# Patient Record
Sex: FEMALE | Race: BLACK OR AFRICAN AMERICAN
[De-identification: names, ages, dates, MRNs, and addresses within clinical notes are randomized per-mention and may not be internally consistent; named-entity substitution may affect disease eponyms.]

---

## 2017-02-21 ENCOUNTER — HOSPITAL ENCOUNTER (EMERGENCY)
Dept: HOSPITAL 17 - PHEFT | Age: 37
Discharge: HOME | End: 2017-02-21
Payer: MEDICARE

## 2017-02-21 VITALS
HEART RATE: 82 BPM | SYSTOLIC BLOOD PRESSURE: 135 MMHG | OXYGEN SATURATION: 96 % | RESPIRATION RATE: 18 BRPM | DIASTOLIC BLOOD PRESSURE: 93 MMHG | TEMPERATURE: 98.3 F

## 2017-02-21 VITALS — BODY MASS INDEX: 42.63 KG/M2 | HEIGHT: 68 IN | WEIGHT: 281.31 LBS

## 2017-02-21 DIAGNOSIS — Z87.09: ICD-10-CM

## 2017-02-21 DIAGNOSIS — Z86.2: ICD-10-CM

## 2017-02-21 DIAGNOSIS — Z87.39: ICD-10-CM

## 2017-02-21 DIAGNOSIS — J01.01: Primary | ICD-10-CM

## 2017-02-21 DIAGNOSIS — Z86.79: ICD-10-CM

## 2017-02-21 DIAGNOSIS — Z21: ICD-10-CM

## 2017-02-21 DIAGNOSIS — Z86.69: ICD-10-CM

## 2017-02-21 DIAGNOSIS — Z86.59: ICD-10-CM

## 2017-02-21 DIAGNOSIS — Z87.19: ICD-10-CM

## 2017-02-21 PROCEDURE — 99283 EMERGENCY DEPT VISIT LOW MDM: CPT

## 2017-02-21 NOTE — PD
HPI


.


Sinus congestion


Chief Complaint:  Cold / Flu Symptoms


Time Seen by Provider:  08:13


Travel History


International Travel<30 days:  No


Contact w/Intl Traveler<30days:  No





History of Present Illness


HPI


Patient presents with about a 3 day history of sinus congestion.  She has been 

treating it at home with Zyrtec-D and Robitussin.  The complicating factor with 

this patient is that she is HIV positive.  She denies any known fever.  Her 

sinus drainage has been purulent.





PFSH


Past Medical History


Anemia:  Yes


Asthma:  No


Autoimmune Disease:  Yes (HIV + )


Anxiety:  Yes


Depression:  Yes


Heart Rhythm Problems:  Yes (SINUS TACH )


Cancer:  No


Cardiovascular Problems:  Yes


Diabetes:  No


Diminished Hearing:  No


Endocrine:  No


Gastrointestinal Disorders:  Yes


Hypertension:  No


Immune Disorder:  Yes (HIV/AIDS/MAC)


Implanted Vascular Access Dvce:  No


Musculoskeletal:  Yes


Neurologic:  Yes


Psychiatric:  Yes


Respiratory:  Yes (occ bronchitis)


Immunizations Current:  Yes


PNEUMOCCOCAL Vaccine (Year):  


Menopausal:  No


:  2


Para:  2





Past Surgical History


Abdominal Surgery:  Yes (BIOPSY OF LYMPH NODES- PEG TUBE PLACEMENT)


 Section:  Yes (X 2)


Cholecystectomy:  No


Gynecologic Surgery:  Yes ((2) C-Sections)


Other Surgery:  Yes (lymph nodes biopsy in abd)





Social History


Alcohol Use:  No


Tobacco Use:  No


Substance Use:  No





Allergies-Medications


(Allergen,Severity, Reaction):  


Coded Allergies:  


     Albuterol (Verified  Allergy, Severe, 9/8/15)


     Bactrim (Verified  Allergy, Severe, Shortness of Breath, 9/8/15)


Reported Meds & Prescriptions





Reported Meds & Active Scripts


Active


Medrol Dosepak (Methylprednisolone) 4 Mg Bharathi 4 Mg PO AS DIRECTED 


     TAKE AS DIRECTED


Tylenol #3 (Acetaminophen/Codeine Phosphate) Acetaminophen 300/30 Codeine Tab 1 

Tab PO Q6H PRN


     FOR PAIN


Zithromax Z-Bharathi (Azithromycin) 250 Mg Tab 250 Mg PO AS DIRECTED 


     500 MG (2 TABLETS) PO ON DAY 1, THEN 250 MG (1 TABLET) PO ON DAYS 2


     TO 5.


Reported


Triumeq 600- mg (Abacavir-Dolutegravir-Lamivudi) 1 Tab Tab 1 Tab PO DAILY 








Review of Systems


Except as stated in HPI:  all other systems reviewed are Neg


General / Constitutional:  No: Fever, Chills


HENT:  Positive: Rhinitis, Rhinorrhea, Congestion, Other (purulent sinus 

drainage)





Physical Exam


Narrative


GENERAL: Healthy-appearing woman in no acute distress.


SKIN: Warm and dry.


HEAD: Atraumatic. Normocephalic.  Tenderness to percussion over the maxillary 

sinuses.


EYES: Pupils equal and round. 


ENT: No nasal bleeding or discharge.  Mucous membranes pink and moist.  No 

edema or erythema of the nasal mucosa.  Oropharynx is clear.


NECK: Trachea midline.  Neck is supple without cervical lymphadenopathy.


CARDIOVASCULAR: Regular rate and rhythm.  Heart sounds are normal.


RESPIRATORY: No accessory muscle use.  Lungs are clear with full air movement 

throughout.


GASTROINTESTINAL: Abdomen soft, non-tender, nondistended. 


MUSCULOSKELETAL: No obvious deformities.   No edema. 


NEUROLOGICAL: Awake and alert. No obvious cranial nerve deficits.  Motor 

grossly within normal limits. Normal speech.


PSYCHIATRIC: Appropriate mood and affect; insight and judgment normal.





Data


Data


Last Documented VS





Vital Signs








  Date Time  Temp Pulse Resp B/P Pulse Ox O2 Delivery O2 Flow Rate FiO2


 


17 07:56 98.3 82 18 135/93 96 Room Air  











MDM


Medical Decision Making


Medical Screen Exam Complete:  Yes


Emergency Medical Condition:  Yes


Differential Diagnosis


Differential diagnosis includes but is not limited to influenza, upper 

respiratory infection, bronchitis, pneumonia


Narrative Course


Patient presents with symptoms compatible with sinusitis.  Because of her 

history of HIV/AIDS, I queried UpToDate which recommends treatment with an 

antibiotic to cover pseudomonas.





Diagnosis





 Primary Impression:  


 Sinusitis


 Qualified Code:  J01.01 - Acute recurrent maxillary sinusitis


 Additional Impression:  


 HIV (human immunodeficiency virus infection)


Patient Instructions:  General Instructions, Sinusitis (ED)





***Additional Instructions:


I recommend the use of a Neti Pot.


You may use a nasal spray such as Afrin for up to 3 days as needed for nasal 

congestion.


You may take an over-the-counter antihistamine such as Zyrtec, Allegra or 

Claritin as needed for runny secretions.


You may take pseudoephedrine as needed for congestion.  You will need to sign 

for this at the pharmacy.


You may take plain Mucinex, 1200 mg twice a day as needed for thick secretions.


You may take a cough syrup such as Delsym as needed for cough.


***Med/Other Pt SpecificInfo:  Prescription(s) given


Scripts


Levofloxacin (Levaquin)500 Mg Nnu482 Mg PO DAILY  #10 TAB  Ref 0


   Prov:Leigh Almodovar MD         17


Disposition:  01 DISCHARGE HOME


Condition:  Stable








Leigh Almodovar MD 2017 08:27

## 2017-03-20 ENCOUNTER — HOSPITAL ENCOUNTER (OUTPATIENT)
Dept: HOSPITAL 17 - CLAB | Age: 37
End: 2017-03-20
Attending: SPECIALIST
Payer: MEDICARE

## 2017-03-20 DIAGNOSIS — B20: Primary | ICD-10-CM

## 2017-03-20 LAB
ERYTHROCYTE [DISTWIDTH] IN BLOOD BY AUTOMATED COUNT: 13.3 % (ref 11.6–17.2)
HCT VFR BLD CALC: 33.6 % (ref 35–46)
MCH RBC QN AUTO: 29 PG (ref 27–34)
MCHC RBC AUTO-ENTMCNC: 34.7 % (ref 32–36)
MCV RBC AUTO: 83.4 FL (ref 80–100)
PLATELET # BLD: 177 TH/MM3 (ref 150–450)
RBC # BLD AUTO: 4.03 MIL/MM3 (ref 4–5.3)
REVIEW FLAG: (no result)
WBC # BLD AUTO: 4 TH/MM3 (ref 4–11)

## 2017-03-20 PROCEDURE — 86359 T CELLS TOTAL COUNT: CPT

## 2017-03-20 PROCEDURE — 86357 NK CELLS TOTAL COUNT: CPT

## 2017-03-20 PROCEDURE — 86355 B CELLS TOTAL COUNT: CPT

## 2017-03-20 PROCEDURE — 85027 COMPLETE CBC AUTOMATED: CPT

## 2017-03-20 PROCEDURE — 36415 COLL VENOUS BLD VENIPUNCTURE: CPT

## 2017-03-20 PROCEDURE — 87536 HIV-1 QUANT&REVRSE TRNSCRPJ: CPT

## 2017-03-20 PROCEDURE — 86360 T CELL ABSOLUTE COUNT/RATIO: CPT

## 2017-03-22 LAB
CD 19 PERCENT: 19 % (ref 6–29)
CD3 CELLS # BLD: 1006 /UL (ref 840–3060)
CD3 CELLS NFR BLD: 74 % (ref 57–85)
CD3+CD4+ CELLS NFR BLD: 13 % (ref 30–61)
CD3+CD4+ CELLS/CD3+CD8+ CLL BLD: 0.2 % (ref 0.86–5)
CD3+CD8+ CELLS # BLD: 786 /UL (ref 180–1170)
CD3+CD8+ CELLS NFR BLD: 56 % (ref 12–42)
LYMPHOCYTES # BLD AUTO: 1363 10*3/UL (ref 850–3900)

## 2017-03-23 LAB — HIV RNA LOG COPIES: 4.72

## 2017-05-31 ENCOUNTER — HOSPITAL ENCOUNTER (OUTPATIENT)
Dept: HOSPITAL 17 - CLAB | Age: 37
End: 2017-05-31
Attending: SPECIALIST
Payer: MEDICARE

## 2017-05-31 DIAGNOSIS — B20: Primary | ICD-10-CM

## 2017-05-31 LAB
ALP SERPL-CCNC: 67 U/L (ref 45–117)
ALT SERPL-CCNC: 19 U/L (ref 10–53)
ANION GAP SERPL CALC-SCNC: 5 MEQ/L (ref 5–15)
AST SERPL-CCNC: 19 U/L (ref 15–37)
BILIRUB SERPL-MCNC: 0.5 MG/DL (ref 0.2–1)
BUN SERPL-MCNC: 10 MG/DL (ref 7–18)
CHLORIDE SERPL-SCNC: 105 MEQ/L (ref 98–107)
ERYTHROCYTE [DISTWIDTH] IN BLOOD BY AUTOMATED COUNT: 14.6 % (ref 11.6–17.2)
GFR SERPLBLD BASED ON 1.73 SQ M-ARVRAT: 60 ML/MIN (ref 89–?)
HCO3 BLD-SCNC: 27.8 MEQ/L (ref 21–32)
HCT VFR BLD CALC: 36.3 % (ref 35–46)
MCH RBC QN AUTO: 29.8 PG (ref 27–34)
MCHC RBC AUTO-ENTMCNC: 34.7 % (ref 32–36)
MCV RBC AUTO: 85.9 FL (ref 80–100)
PLATELET # BLD: 219 TH/MM3 (ref 150–450)
POTASSIUM SERPL-SCNC: 3.7 MEQ/L (ref 3.5–5.1)
RBC # BLD AUTO: 4.23 MIL/MM3 (ref 4–5.3)
REVIEW FLAG: (no result)
SODIUM SERPL-SCNC: 138 MEQ/L (ref 136–145)
WBC # BLD AUTO: 5.2 TH/MM3 (ref 4–11)

## 2017-05-31 PROCEDURE — 86359 T CELLS TOTAL COUNT: CPT

## 2017-05-31 PROCEDURE — 36415 COLL VENOUS BLD VENIPUNCTURE: CPT

## 2017-05-31 PROCEDURE — 80053 COMPREHEN METABOLIC PANEL: CPT

## 2017-05-31 PROCEDURE — 86357 NK CELLS TOTAL COUNT: CPT

## 2017-05-31 PROCEDURE — 85027 COMPLETE CBC AUTOMATED: CPT

## 2017-05-31 PROCEDURE — 86360 T CELL ABSOLUTE COUNT/RATIO: CPT

## 2017-05-31 PROCEDURE — 86355 B CELLS TOTAL COUNT: CPT

## 2017-05-31 PROCEDURE — 87536 HIV-1 QUANT&REVRSE TRNSCRPJ: CPT

## 2017-06-02 LAB
CD 19 PERCENT: 25 % (ref 6–29)
CD3 CELLS # BLD: 1167 /UL (ref 840–3060)
CD3 CELLS NFR BLD: 68 % (ref 57–85)
CD3+CD4+ CELLS NFR BLD: 15 % (ref 30–61)
CD3+CD4+ CELLS/CD3+CD8+ CLL BLD: 0.3 % (ref 0.86–5)
CD3+CD8+ CELLS # BLD: 813 /UL (ref 180–1170)
CD3+CD8+ CELLS NFR BLD: 49 % (ref 12–42)

## 2017-06-03 LAB
HIV RNA COPIES: (no result)
HIV RNA LOG COPIES: (no result)

## 2017-09-02 ENCOUNTER — HOSPITAL ENCOUNTER (EMERGENCY)
Dept: HOSPITAL 17 - PHED | Age: 37
Discharge: HOME | End: 2017-09-02
Payer: MEDICARE

## 2017-09-02 VITALS — DIASTOLIC BLOOD PRESSURE: 72 MMHG | SYSTOLIC BLOOD PRESSURE: 128 MMHG

## 2017-09-02 VITALS
RESPIRATION RATE: 12 BRPM | TEMPERATURE: 98.3 F | HEART RATE: 91 BPM | DIASTOLIC BLOOD PRESSURE: 73 MMHG | SYSTOLIC BLOOD PRESSURE: 135 MMHG | OXYGEN SATURATION: 99 %

## 2017-09-02 VITALS — HEIGHT: 67 IN | WEIGHT: 293 LBS | BODY MASS INDEX: 45.99 KG/M2

## 2017-09-02 DIAGNOSIS — Z86.59: ICD-10-CM

## 2017-09-02 DIAGNOSIS — Z86.79: ICD-10-CM

## 2017-09-02 DIAGNOSIS — Z86.2: ICD-10-CM

## 2017-09-02 DIAGNOSIS — Z87.39: ICD-10-CM

## 2017-09-02 DIAGNOSIS — Z72.0: ICD-10-CM

## 2017-09-02 DIAGNOSIS — Z86.69: ICD-10-CM

## 2017-09-02 DIAGNOSIS — Z21: ICD-10-CM

## 2017-09-02 DIAGNOSIS — K05.10: Primary | ICD-10-CM

## 2017-09-02 DIAGNOSIS — Z87.19: ICD-10-CM

## 2017-09-02 DIAGNOSIS — K04.7: ICD-10-CM

## 2017-09-02 PROCEDURE — 99284 EMERGENCY DEPT VISIT MOD MDM: CPT

## 2017-09-02 NOTE — PD
HPI


Chief Complaint:  Oral / Dental Pain or Problem


Time Seen by Provider:  01:57


Travel History


International Travel<30 days:  No


Contact w/Intl Traveler<30days:  No


Traveled to known affect area:  No





History of Present Illness


HPI


The patient is a 37-year-old female that has dental pain in teeth #29, 30, 31 

and 32.  She has no appointment with a dentist yet but states she will get one.

  She is noted this pain getting worse in the last 24 hours.  She knows she has 

dental problems  chronically.  She has never seen a dentist about this.  She 

denies any fever.  She is HIV positive but her T4 count is excellent.





PFSH


Past Medical History


Anemia:  Yes


Asthma:  No


Autoimmune Disease:  Yes (HIV + )


Anxiety:  Yes


Depression:  Yes


Heart Rhythm Problems:  Yes (SINUS TACH )


Cancer:  No


Cardiovascular Problems:  Yes


Diabetes:  No


Diminished Hearing:  No


Endocrine:  No


Gastrointestinal Disorders:  Yes


Hypertension:  No


Immune Disorder:  Yes (HIV/AIDS/MAC)


Implanted Vascular Access Dvce:  No


Musculoskeletal:  Yes


Neurologic:  Yes


Psychiatric:  Yes


Respiratory:  Yes (occ bronchitis)


Immunizations Current:  Yes


PNEUMOCCOCAL Vaccine (Year):  


Pregnant?:  Not Pregnant


LMP:  17


Menopausal:  No


:  2


Para:  2





Past Surgical History


Abdominal Surgery:  Yes (BIOPSY OF LYMPH NODES- PEG TUBE PLACEMENT)


 Section:  Yes (X 2)


Cholecystectomy:  No


Gynecologic Surgery:  Yes ((2) C-Sections)


Other Surgery:  Yes (lymph nodes biopsy in abd)





Social History


Alcohol Use:  No


Tobacco Use:  Yes


Substance Use:  No





Allergies-Medications


(Allergen,Severity, Reaction):  


Coded Allergies:  


     albuterol (Unverified  Allergy, Severe, 17)


     sulfamethoxazole (Unverified  Allergy, Severe, Shortness of Breath, 17)


     trimethoprim (Unverified  Allergy, Severe, Shortness of Breath, 17)


Reported Meds & Prescriptions





Reported Meds & Active Scripts


Active


Reported


Triumeq (Abacavir-Dolutegravir-Lamivudine) 600- Mg Tab 1 Tab PO DAILY


     Hazardous agent; use appropriate precautions for handling & disposal.








Review of Systems


Except as stated in HPI:  all other systems reviewed are Neg





Physical Exam


Narrative


GENERAL: Well-nourished, obese patient in moderate apparent distress with her 

dental pain.  Her vital signs are normal.


SKIN: Focused skin assessment warm/dry.


HEAD: Normocephalic.


EYES: No scleral icterus. No injection or drainage. 


NECK: Supple, trachea midline. No JVD or lymphadenopathy.


CARDIOVASCULAR: Regular rate and rhythm without murmurs, gallops, or rubs. 


RESPIRATORY: Breath sounds equal bilaterally. No accessory muscle use.


GASTROINTESTINAL: Abdomen soft, non-tender, nondistended. 


MUSCULOSKELETAL: No cyanosis, or edema. 


BACK: Nontender without obvious deformity. No CVA tenderness. 


DENTAL: Teeth #29, 30, 31 and 32 are partly broken down and there is calm 

swelling around this area and exquisitely tender to the touch.  No drainable 

abscesses are noted however.  No malocclusion.





Data


Data


Last Documented VS





Vital Signs








  Date Time  Temp Pulse Resp B/P (MAP) Pulse Ox O2 Delivery O2 Flow Rate FiO2


 


17 01:25 98.3 91 12 135/73 (93) 99   











MDM


Medical Decision Making


Medical Screen Exam Complete:  Yes


Emergency Medical Condition:  Yes


Medical Record Reviewed:  Yes


Differential Diagnosis


Dental infection, drainable dental abscess, gingivitis, drug seeking behavior-

highly unlikely, Cecil's angina-highly unlikely


Narrative Course


The patient has a dental infection with gingivitis.  She needs antibiotics and 

she needs a dentist.  She will be given Percocet for pain.





Diagnosis





 Primary Impression:  


 Gingivitis


 Additional Impression:  


 Chronic dental infection





***Additional Instructions:  


As we discussed, follow-up with a dentist.  Antibiotics and pain pills are not 

going to be enough for you.


***Med/Other Pt SpecificInfo:  Prescription(s) given


Scripts


Oxycodone-Acetaminophen (Percocet) 7.5-325 mg Tab


1 TAB PO Q4H Y for PAIN, #28 TAB 0 Refills


   Prov: Armando Portillo MD         17 


Amoxicillin (Amoxicillin) 875 Mg Tab


875 MG PO BID for Infection for 14 Days, TAB 0 Refills


   Prov: Armando Portillo MD         17


Disposition:  01 DISCHARGE HOME


Condition:  Stable











Armadno Portillo MD Sep 2, 2017 02:08

## 2017-09-07 ENCOUNTER — HOSPITAL ENCOUNTER (EMERGENCY)
Dept: HOSPITAL 17 - PHED | Age: 37
Discharge: HOME | End: 2017-09-07
Payer: MEDICARE

## 2017-09-07 VITALS
SYSTOLIC BLOOD PRESSURE: 140 MMHG | DIASTOLIC BLOOD PRESSURE: 96 MMHG | OXYGEN SATURATION: 99 % | HEART RATE: 82 BPM | RESPIRATION RATE: 14 BRPM | TEMPERATURE: 98.1 F

## 2017-09-07 VITALS — BODY MASS INDEX: 45.99 KG/M2 | HEIGHT: 67 IN | WEIGHT: 293 LBS

## 2017-09-07 DIAGNOSIS — F17.210: ICD-10-CM

## 2017-09-07 DIAGNOSIS — Z21: ICD-10-CM

## 2017-09-07 DIAGNOSIS — D64.9: ICD-10-CM

## 2017-09-07 DIAGNOSIS — J01.01: Primary | ICD-10-CM

## 2017-09-07 PROCEDURE — 99283 EMERGENCY DEPT VISIT LOW MDM: CPT

## 2017-09-07 NOTE — PD
HPI


Chief Complaint:  sinus congestion


Time Seen by Provider:  01:37


Travel History


International Travel<30 days:  No


Contact w/Intl Traveler<30days:  No


Traveled to known affect area:  No





History of Present Illness


HPI


37-year-old female complains of nasal congestion, postnasal drip, facial pain 

and facial pressure.  Patient states that the symptoms started several days ago 

and got worse today.  Patient denies any headache.  Patient denies any visual 

change.  Patient denies earache or sore throat.  Patient denies any coughing.  

Patient denies any chest pain or shortness of breath.  Patient denies abdominal 

pain.  Patient denies any nausea vomiting diarrhea.  Patient has history of HIV 

positive.  Patient states that her CD4 counts in the 280 range.  Patient states 

that her viral load is low.





PFSH


Past Medical History


Anemia:  Yes


Asthma:  No


Autoimmune Disease:  Yes (HIV + )


Anxiety:  Yes


Depression:  Yes


Heart Rhythm Problems:  Yes (SINUS TACH )


Cardiovascular Problems:  Yes


Diabetes:  No


Diminished Hearing:  No


Endocrine:  No


Gastrointestinal Disorders:  Yes


Hypertension:  No


Immune Disorder:  Yes (HIV/AIDS/MAC)


Implanted Vascular Access Dvce:  No


Musculoskeletal:  Yes


Neurologic:  Yes


Psychiatric:  Yes


Respiratory:  Yes (occ bronchitis)


Immunizations Current:  Yes


PNEUMOCCOCAL Vaccine (Year):  


Menopausal:  No


:  2


Para:  2





Past Surgical History


Abdominal Surgery:  Yes (BIOPSY OF LYMPH NODES- PEG TUBE PLACEMENT)


 Section:  Yes (X 2)


Cholecystectomy:  No


Gynecologic Surgery:  Yes ((2) C-Sections)


Other Surgery:  Yes (lymph nodes biopsy in abd)





Social History


Alcohol Use:  No


Tobacco Use:  Yes (2 CIGARETTES PER DAY)


Substance Use:  No





Allergies-Medications


(Allergen,Severity, Reaction):  


Coded Allergies:  


     albuterol (Unverified  Allergy, Severe, 17)


     sulfamethoxazole (Unverified  Allergy, Severe, Shortness of Breath, 17)


     trimethoprim (Unverified  Allergy, Severe, Shortness of Breath, 17)


Reported Meds & Prescriptions





Reported Meds & Active Scripts


Active


Percocet (Oxycodone-Acetaminophen) 7.5-325 mg Tab 1 Tab PO Q4H PRN


Amoxicillin 875 Mg Tab 875 Mg PO BID 14 Days


Reported


Triumeq (Abacavir-Dolutegravir-Lamivudine) 600- Mg Tab 1 Tab PO DAILY


     Hazardous agent; use appropriate precautions for handling & disposal.








Review of Systems


General / Constitutional:  No: Fever


Eyes:  No: Visual changes


HENT:  Positive: Congestion, No: Headaches


Cardiovascular:  No: Chest Pain or Discomfort


Respiratory:  No: Shortness of Breath


Gastrointestinal:  No: Abdominal Pain


Genitourinary:  No: Dysuria


Musculoskeletal:  No: Pain


Skin:  No Rash


Neurologic:  No: Weakness


Psychiatric:  No: Depression


Endocrine:  No: Polydipsia


Hematologic/Lymphatic:  No: Easy Bruising





Physical Exam


Narrative


GENERAL: Well-nourished, well-developed patient.


SKIN: Focused skin assessment warm/dry.


HEAD: Normocephalic.


EYES: No scleral icterus. No injection or drainage. 


Throat: Nonerythematous.


Nasal mucosa erythematous and boggy.


Patient has tenderness on palpation maxillary sinus area.


NECK: Supple, trachea midline. No JVD or lymphadenopathy.


CARDIOVASCULAR: Regular rate and rhythm without murmurs, gallops, or rubs. 


RESPIRATORY: Breath sounds equal bilaterally. No accessory muscle use.


GASTROINTESTINAL: Abdomen soft, non-tender, nondistended. 


MUSCULOSKELETAL: No cyanosis, or edema. 


BACK: Nontender without obvious deformity. No CVA tenderness. 


Neurologic exam normal.





Data


Data


Last Documented VS





Vital Signs








  Date Time  Temp Pulse Resp B/P (MAP) Pulse Ox O2 Delivery O2 Flow Rate FiO2


 


17 01:40 98.1 82 14 140/96 (111) 99   








Orders





 Orders


Azithromycin (Zithromax) (17 02:00)








[facility]


Medical Decision Making


Medical Screen Exam Complete:  Yes


Emergency Medical Condition:  Yes


Differential Diagnosis


Differential diagnosis including sinusitis, URI, pharyngitis, bronchitis, 

pneumonia.


Narrative Course


37-year-old female with nasal congestion and sinus pressure and facial pain.  

History of HIV positive.  Zithromax 500 mg by mouth given.





Diagnosis





 Primary Impression:  


 Sinusitis


 Qualified Codes:  J01.01 - Acute recurrent maxillary sinusitis





***Additional Instructions:  


Z-Bharathi as directed.  Over-the-counter Flonase nasal spray as directed.  Follow-

up with personal physician.  Return if worse.


***Med/Other Pt SpecificInfo:  Prescription(s) given


Scripts


Fluticasone Nasal Spray (Flonase Nasal Spray) 50 Mcg/Act Spray


100 MCG EACH NARE BID for Allergies, #1 BOTTLE 0 Refills


   Prov: Wally Stroud MD         17 


Azithromycin (Zithromax Z-Bharathi) 250 Mg Dspk


250 MG PO AS DIRECTED for Infection, #1 DSPK 0 Refills


   500 MG (2 tabs) day 1, then 1 tab days 2-5.


   Prov: Wally Stroud MD         17


Disposition:  01 DISCHARGE HOME


Condition:  Stable











Wally Stroud MD Sep 7, 2017 01:56

## 2018-03-12 ENCOUNTER — HOSPITAL ENCOUNTER (OUTPATIENT)
Dept: HOSPITAL 17 - CLAB | Age: 38
End: 2018-03-12
Attending: SPECIALIST
Payer: MEDICARE

## 2018-03-12 DIAGNOSIS — B20: Primary | ICD-10-CM

## 2018-03-12 LAB
ALBUMIN SERPL-MCNC: 3.5 GM/DL (ref 3.4–5)
ALP SERPL-CCNC: 78 U/L (ref 45–117)
ALT SERPL-CCNC: 17 U/L (ref 10–53)
AST SERPL-CCNC: 22 U/L (ref 15–37)
BILIRUB SERPL-MCNC: 0.6 MG/DL (ref 0.2–1)
BUN SERPL-MCNC: 11 MG/DL (ref 7–18)
CALCIUM SERPL-MCNC: 9.4 MG/DL (ref 8.5–10.1)
CHLORIDE SERPL-SCNC: 106 MEQ/L (ref 98–107)
CREAT SERPL-MCNC: 1.09 MG/DL (ref 0.5–1)
ERYTHROCYTE [DISTWIDTH] IN BLOOD BY AUTOMATED COUNT: 13.9 % (ref 11.6–17.2)
GFR SERPLBLD BASED ON 1.73 SQ M-ARVRAT: 69 ML/MIN (ref 89–?)
HCO3 BLD-SCNC: 23.1 MEQ/L (ref 21–32)
HCT VFR BLD CALC: 35.8 % (ref 35–46)
HGB BLD-MCNC: 12.4 GM/DL (ref 11.6–15.3)
MCH RBC QN AUTO: 29.2 PG (ref 27–34)
MCHC RBC AUTO-ENTMCNC: 34.7 % (ref 32–36)
MCV RBC AUTO: 84 FL (ref 80–100)
PLATELET # BLD: 155 TH/MM3 (ref 150–450)
PMV BLD AUTO: 9.1 FL (ref 7–11)
PROT SERPL-MCNC: 9.1 GM/DL (ref 6.4–8.2)
RBC # BLD AUTO: 4.26 MIL/MM3 (ref 4–5.3)
SODIUM SERPL-SCNC: 138 MEQ/L (ref 136–145)
WBC # BLD AUTO: 5 TH/MM3 (ref 4–11)

## 2018-03-12 PROCEDURE — 80053 COMPREHEN METABOLIC PANEL: CPT

## 2018-03-12 PROCEDURE — 86357 NK CELLS TOTAL COUNT: CPT

## 2018-03-12 PROCEDURE — 85027 COMPLETE CBC AUTOMATED: CPT

## 2018-03-12 PROCEDURE — 87901 NFCT AGT GNTYP ALYS HIV1 REV: CPT

## 2018-03-12 PROCEDURE — 86355 B CELLS TOTAL COUNT: CPT

## 2018-03-12 PROCEDURE — 86360 T CELL ABSOLUTE COUNT/RATIO: CPT

## 2018-03-12 PROCEDURE — 86359 T CELLS TOTAL COUNT: CPT

## 2018-03-12 PROCEDURE — 82105 ALPHA-FETOPROTEIN SERUM: CPT

## 2018-03-12 PROCEDURE — 87536 HIV-1 QUANT&REVRSE TRNSCRPJ: CPT

## 2018-03-12 PROCEDURE — 36415 COLL VENOUS BLD VENIPUNCTURE: CPT

## 2018-03-13 LAB
CD3-/CD16+CD56+ PERCENT: 11 % (ref 4–25)
CD3-CD16+CD56+ (ABSOLUTE): 115 (ref 70–760)
LYMPHOCYTES # BLD AUTO: 1093 10*3/UL (ref 850–3900)

## 2018-03-21 ENCOUNTER — HOSPITAL ENCOUNTER (EMERGENCY)
Dept: HOSPITAL 17 - PHEFT | Age: 38
Discharge: HOME | End: 2018-03-21
Payer: MEDICARE

## 2018-03-21 VITALS — HEIGHT: 68 IN | WEIGHT: 293 LBS | BODY MASS INDEX: 44.41 KG/M2

## 2018-03-21 VITALS
RESPIRATION RATE: 16 BRPM | OXYGEN SATURATION: 96 % | SYSTOLIC BLOOD PRESSURE: 132 MMHG | DIASTOLIC BLOOD PRESSURE: 80 MMHG | HEART RATE: 79 BPM | TEMPERATURE: 98.8 F

## 2018-03-21 DIAGNOSIS — F32.9: ICD-10-CM

## 2018-03-21 DIAGNOSIS — Z72.0: ICD-10-CM

## 2018-03-21 DIAGNOSIS — Z88.8: ICD-10-CM

## 2018-03-21 DIAGNOSIS — B96.89: ICD-10-CM

## 2018-03-21 DIAGNOSIS — Z79.899: ICD-10-CM

## 2018-03-21 DIAGNOSIS — B20: Primary | ICD-10-CM

## 2018-03-21 DIAGNOSIS — B96.1: ICD-10-CM

## 2018-03-21 DIAGNOSIS — B96.5: ICD-10-CM

## 2018-03-21 DIAGNOSIS — S31.109A: ICD-10-CM

## 2018-03-21 DIAGNOSIS — B95.61: ICD-10-CM

## 2018-03-21 DIAGNOSIS — Z88.2: ICD-10-CM

## 2018-03-21 DIAGNOSIS — L08.9: ICD-10-CM

## 2018-03-21 LAB
BASOPHILS # BLD AUTO: 0 TH/MM3 (ref 0–0.2)
BASOPHILS NFR BLD: 0.2 % (ref 0–2)
BUN SERPL-MCNC: 18 MG/DL (ref 7–18)
CALCIUM SERPL-MCNC: 9.4 MG/DL (ref 8.5–10.1)
CHLORIDE SERPL-SCNC: 103 MEQ/L (ref 98–107)
CREAT SERPL-MCNC: 1.1 MG/DL (ref 0.5–1)
EOSINOPHIL # BLD: 0 TH/MM3 (ref 0–0.4)
EOSINOPHIL NFR BLD: 0.5 % (ref 0–4)
ERYTHROCYTE [DISTWIDTH] IN BLOOD BY AUTOMATED COUNT: 13.6 % (ref 11.6–17.2)
GFR SERPLBLD BASED ON 1.73 SQ M-ARVRAT: 68 ML/MIN (ref 89–?)
GLUCOSE SERPL-MCNC: 96 MG/DL (ref 74–106)
HCO3 BLD-SCNC: 25.1 MEQ/L (ref 21–32)
HCT VFR BLD CALC: 39.9 % (ref 35–46)
HGB BLD-MCNC: 13.3 GM/DL (ref 11.6–15.3)
LYMPHOCYTES # BLD AUTO: 1.3 TH/MM3 (ref 1–4.8)
LYMPHOCYTES NFR BLD AUTO: 32.8 % (ref 9–44)
MCH RBC QN AUTO: 28.3 PG (ref 27–34)
MCHC RBC AUTO-ENTMCNC: 33.4 % (ref 32–36)
MCV RBC AUTO: 84.8 FL (ref 80–100)
MONOCYTE #: 0.5 TH/MM3 (ref 0–0.9)
MONOCYTES NFR BLD: 12 % (ref 0–8)
NEUTROPHILS # BLD AUTO: 2.2 TH/MM3 (ref 1.8–7.7)
NEUTROPHILS NFR BLD AUTO: 54.5 % (ref 16–70)
PLATELET # BLD: 191 TH/MM3 (ref 150–450)
PMV BLD AUTO: 9.3 FL (ref 7–11)
RBC # BLD AUTO: 4.71 MIL/MM3 (ref 4–5.3)
SODIUM SERPL-SCNC: 134 MEQ/L (ref 136–145)
WBC # BLD AUTO: 4 TH/MM3 (ref 4–11)

## 2018-03-21 PROCEDURE — 96374 THER/PROPH/DIAG INJ IV PUSH: CPT

## 2018-03-21 PROCEDURE — 87070 CULTURE OTHR SPECIMN AEROBIC: CPT

## 2018-03-21 PROCEDURE — 80048 BASIC METABOLIC PNL TOTAL CA: CPT

## 2018-03-21 PROCEDURE — 84703 CHORIONIC GONADOTROPIN ASSAY: CPT

## 2018-03-21 PROCEDURE — 99285 EMERGENCY DEPT VISIT HI MDM: CPT

## 2018-03-21 PROCEDURE — 87185 SC STD ENZYME DETCJ PER NZM: CPT

## 2018-03-21 PROCEDURE — 87077 CULTURE AEROBIC IDENTIFY: CPT

## 2018-03-21 PROCEDURE — 87186 SC STD MICRODIL/AGAR DIL: CPT

## 2018-03-21 PROCEDURE — 87205 SMEAR GRAM STAIN: CPT

## 2018-03-21 PROCEDURE — 83605 ASSAY OF LACTIC ACID: CPT

## 2018-03-21 PROCEDURE — 85025 COMPLETE CBC W/AUTO DIFF WBC: CPT

## 2018-03-21 PROCEDURE — 87040 BLOOD CULTURE FOR BACTERIA: CPT

## 2018-03-21 PROCEDURE — 86403 PARTICLE AGGLUT ANTBDY SCRN: CPT

## 2018-03-21 NOTE — PD
HPI


Chief Complaint:  Skin Problem


Time Seen by Provider:  11:13


Travel History


International Travel<30 days:  No


Contact w/Intl Traveler<30days:  No


Traveled to known affect area:  No





History of Present Illness


HPI


This is a 37-year-old female with history of HIV, not currently on antivirals, 

last CD4 count 85 1 week ago.  She is here for evaluation of a wound to her 

abdomen for the last 3 weeks.  She reports the area arose spontaneously and has 

steadily increased in size producing a malodorous discharge.  Denies fever, but 

reported chills.  No abdominal pain.  No nausea vomiting.  Severity is 

moderate.  No aggravating or alleviating factors.





PFSH


Past Medical History


Anemia:  Yes


Asthma:  No


Autoimmune Disease:  Yes (HIV + )


Anxiety:  Yes


Depression:  Yes


Heart Rhythm Problems:  Yes (SINUS TACH )


Cardiovascular Problems:  Yes


Chemotherapy:  No


Diabetes:  No


Diminished Hearing:  No


Endocrine:  No


Gastrointestinal Disorders:  Yes


Hypertension:  No


Immune Disorder:  Yes (HIV/AIDS/MAC)


Implanted Vascular Access Dvce:  No


Musculoskeletal:  Yes


Neurologic:  Yes


Psychiatric:  Yes


Respiratory:  Yes (occ bronchitis)


Immunizations Current:  Yes


PNEUMOCCOCAL Vaccine (Year):  


Pregnant?:  Not Pregnant


LMP:  3/7/18


Menopausal:  No


:  2


Para:  2





Past Surgical History


Abdominal Surgery:  Yes (BIOPSY OF LYMPH NODES- PEG TUBE PLACEMENT)


 Section:  Yes (X 2)


Cholecystectomy:  No


Gynecologic Surgery:  Yes ((2) C-Sections)


Other Surgery:  Yes (lymph nodes biopsy in abd)





Social History


Alcohol Use:  No


Tobacco Use:  Yes (2 CIGARETTES PER DAY)


Substance Use:  No





Allergies-Medications


(Allergen,Severity, Reaction):  


Coded Allergies:  


     albuterol (Verified  Allergy, Severe, 3/21/18)


     sulfamethoxazole (Verified  Allergy, Severe, Shortness of Breath, 3/21/18)


     trimethoprim (Verified  Allergy, Severe, Shortness of Breath, 3/21/18)


Reported Meds & Prescriptions





Reported Meds & Active Scripts


Active


Flonase Nasal Spray (Fluticasone Nasal Spray) 50 Mcg/Act Spray 100 Mcg EACH 

NARE BID


Zithromax Z-Bharathi (Azithromycin) 250 Mg Dspk 250 Mg PO AS DIRECTED


     500 MG (2 tabs) day 1, then 1 tab days 2-5.


Percocet (Oxycodone-Acetaminophen) 7.5-325 mg Tab 1 Tab PO Q4H PRN


Amoxicillin 875 Mg Tab 875 Mg PO BID 14 Days


Reported


Triumeq (Abacavir-Dolutegravir-Lamivudine) 600- Mg Tab 1 Tab PO DAILY


     Hazardous agent; use appropriate precautions for handling & disposal.








Review of Systems


Except as stated in HPI:  all other systems reviewed are Neg


General / Constitutional:  No: Fever


Eyes:  No: Visual changes


HENT:  No: Headaches


Cardiovascular:  No: Chest Pain or Discomfort


Respiratory:  No: Shortness of Breath


Gastrointestinal:  No: Abdominal Pain


Genitourinary:  No: Dysuria


Musculoskeletal:  No: Pain


Skin:  Positive Lesions


Neurologic:  No: Weakness


Psychiatric:  No: Depression





Physical Exam


Narrative


GENERAL: Alert and nontoxic-appearing 37-year-old female.


SKIN: Approximately 6 cm diameter shallow ulcer to the abdomen in the location 

of the skin fold.  Wound has malodorous yellow discharge.  No surrounding 

induration or fluctuance.


HEAD: Normocephalic.


EYES:  No injection or drainage. 


Ears/nose/throat: Mucous membranes are moist.  No oral lesions. 


NECK: Supple, trachea midline. No lymphadenopathy.


CARDIOVASCULAR: Regular rate and rhythm 


RESPIRATORY: Breath sounds equal bilaterally. No accessory muscle use.


GASTROINTESTINAL: Abdomen soft, non-tender, nondistended. 


MUSCULOSKELETAL: No cyanosis, or edema. 


BACK: Nontender without obvious deformity. No CVA tenderness.








Data


Data


Last Documented VS





Vital Signs








  Date Time  Temp Pulse Resp B/P (MAP) Pulse Ox O2 Delivery O2 Flow Rate FiO2


 


3/21/18 11:06 98.8 79 16 132/80 (97) 96   








Orders





 Orders


Basic Metabolic Panel (Bmp) (3/21/18 11:31)


Complete Blood Count With Diff (3/21/18 11:31)


Blood Culture (3/21/18 11:31)


Wound Culture And Gram Stain (3/21/18 11:31)


Iv Access Insert/Monitor (3/21/18 11:31)


Lactic Acid Sepsis Protocol (3/21/18 11:31)


Ed Urine Pregnancytest Poc (3/21/18 12:40)


Piperacil-Tazo 4.5 Gm Premix (Zosyn 4.5 (3/21/18 12:43)


Vancomycin Inj (Vancomycin Inj) (3/21/18 12:43)


Admit Order (Ed Use Only) (3/21/18 13:11)





Labs





Laboratory Tests








Test


  3/21/18


11:46 3/21/18


11:52


 


White Blood Count 4.0 TH/MM3  


 


Red Blood Count 4.71 MIL/MM3  


 


Hemoglobin 13.3 GM/DL  


 


Hematocrit 39.9 %  


 


Mean Corpuscular Volume 84.8 FL  


 


Mean Corpuscular Hemoglobin 28.3 PG  


 


Mean Corpuscular Hemoglobin


Concent 33.4 % 


  


 


 


Red Cell Distribution Width 13.6 %  


 


Platelet Count 191 TH/MM3  


 


Mean Platelet Volume 9.3 FL  


 


Neutrophils (%) (Auto) 54.5 %  


 


Lymphocytes (%) (Auto) 32.8 %  


 


Monocytes (%) (Auto) 12.0 %  


 


Eosinophils (%) (Auto) 0.5 %  


 


Basophils (%) (Auto) 0.2 %  


 


Neutrophils # (Auto) 2.2 TH/MM3  


 


Lymphocytes # (Auto) 1.3 TH/MM3  


 


Monocytes # (Auto) 0.5 TH/MM3  


 


Eosinophils # (Auto) 0.0 TH/MM3  


 


Basophils # (Auto) 0.0 TH/MM3  


 


CBC Comment DIFF FINAL  


 


Differential Comment   


 


Blood Urea Nitrogen 18 MG/DL  


 


Creatinine 1.10 MG/DL  


 


Random Glucose 96 MG/DL  


 


Calcium Level 9.4 MG/DL  


 


Sodium Level 134 MEQ/L  


 


Potassium Level 3.9 MEQ/L  


 


Chloride Level 103 MEQ/L  


 


Carbon Dioxide Level 25.1 MEQ/L  


 


Anion Gap 6 MEQ/L  


 


Estimat Glomerular Filtration


Rate 68 ML/MIN 


  


 


 


Lactic Acid Level  1.0 mmol/L 











[facility]


Medical Decision Making


Medical Screen Exam Complete:  Yes


Emergency Medical Condition:  Yes


Interpretation(s)


Afebrile, no tachycardia.


CBC: WBC 4


BMP: Creatinine 1.1


LACTIC: 1


Urine pregnancy negative


Differential Diagnosis


Infected wound ulcer, opportunistic infection, cellulitis


Narrative Course


37-year-old female with infected wound ulcer to her abdomen 3 weeks.  History 

of HIV with a CD4 count of 85.  She has an active infection wound infection. 





Spoke with Dr. SCOTTY Barba [facility].  Patient will be admitted to their services.





Diagnosis





 Primary Impression:  


 AIDS (acquired immunodeficiency syndrome), CD4 <=200


 Additional Impression:  


 Wound infection





Admitting Information


Admitting Physician Requests:  Admit











Shy Villavicencio Mar 21, 2018 11:55

## 2018-03-21 NOTE — PD.CONS
HPI


Service


[facility]


Consult Requested By


ER MD


Reason for Consult


Evaluation of skin lesion


Primary Care Physician


Eleni Savage MD


Diagnoses:  


History of Present Illness


This patient is a 37-year-old female with a history of HIV nonadherent with 

treatment plans.  She comes to the emergency room after 2 weeks of a skin 

lesion.  There was a bullous lesion in the middle of her pannus.  She has been 

exercising quite a bit and the bolus skin came off and there was left and 

ulcerated lesion underneath the bullous lesion.  Patient says she has been 

trying to keep it clean but she left it open and was sweating while exercising 

and then she noted it smelled funny yesterday so she came in the hospital.  

There is no fever or chills.  Patient has not had any nausea or vomiting or 

toxic symptoms.  Patient is alert and oriented in bed sitting on her phone and 

has received 1 dose of vancomycin in the emergency room.  At this point I do 

not feel that the patient has failed any outpatient therapy and this is most 

appropriate given her clinical status.  Patient will be discharged from the 

emergency room to follow-up with infectious disease team.  She is encouraged to 

continue with HIV therapy.  This is discussed with the ER MD





Review of Systems


Constitutional:  DENIES: Diaphoretic episodes, Fatigue, Fever, Weight gain, 

Weight loss, Chills, Dizziness, Change in appetite, Night Sweats


Endocrine:  DENIES: Abnorml menstrual pattern, Heat/cold intolerance, Polydipsia

, Polyuria, Polyphagia


Eyes:  DENIES: Blurred vision, Diplopia, Eye inflammation, Eye pain, Vision loss

, Photosensitivity, Double Vision


Respiratory:  DENIES: Apneas, Cough, Snoring, Wheezing, Hemoptysis, Sputum 

production, Shortness of breath


Cardiovascular:  DENIES: Chest pain, Palpitations, Syncope, Dyspnea on Exertion

, PND, Lower Extremity Edema, Orthopnea, Claudication


Genitourinary:  DENIES: Abnormal vaginal bleeding, Dysmenorrhea, Dyspareunia, 

Sexual dysfunction, Urinary frequency, Urinary incontinence, Urgency, Hematuria

, Dysuria, Nocturia, Vaginal discharge


Musculoskeletal:  DENIES: Joint pain, Muscle aches, Stiffness, Joint Swelling, 

Back pain, Neck pain


Integumentary:  DENIES: Abnormal pigmentation, Pruritus, Rash, Nail changes, 

Breast masses, Breast skin changes, Nipple discharge


Hematologic/lymphatic:  DENIES: Bruising, Lymphadenopathy


Immunologic/allergic:  DENIES: Eczema, Urticaria


Neurologic:  DENIES: Abnormal gait, Headache, Localized weakness, Paresthesias, 

Seizures, Speech Problems, Tremor, Poor Balance


Except as stated in HPI:  all other systems reviewed are Neg





Past Family Social History


Allergies:  


Coded Allergies:  


     albuterol (Verified  Allergy, Severe, 3/21/18)


     sulfamethoxazole (Verified  Allergy, Severe, Shortness of Breath, 3/21/18)


     trimethoprim (Verified  Allergy, Severe, Shortness of Breath, 3/21/18)


Past Medical History


HIV


Past Surgical History


 


Reported Medications


Reviewed in the EMR, nonadherent with medical treatment for HIV


Active Ordered Medications


Reviewed in the EMR


Family History


Mother is alive and well, father  from colon cancer


Social History


Patient smokes daily, lives with her children, no recent travel, not employed 

and not in school





Physical Exam


Vital Signs





Vital Signs








  Date Time  Temp Pulse Resp B/P (MAP) Pulse Ox O2 Delivery O2 Flow Rate FiO2


 


3/21/18 11:06 98.8 79 16 132/80 (97) 96   








Physical Exam


GENERAL: This is a well-nourished, well-developed patient, in no apparent 

distress.


SKIN: Quarter sized abdominal pannus ulcerated area without induration, no 

rashes, ecchymoses or lesions. Cool and dry.


HEAD: Atraumatic. Normocephalic. No temporal or scalp tenderness.


EYES: Pupils equal round and reactive. Extraocular motions intact. No scleral 

icterus. No injection or drainage. 


ENT: Nose without bleeding, purulent drainage or septal hematoma. Throat 

without erythema, tonsillar hypertrophy or exudate. Uvula midline. Airway 

patent.


NECK: Trachea midline. No JVD or lymphadenopathy. Supple, nontender, no 

meningeal signs.


CARDIOVASCULAR: Regular rate and rhythm without murmurs, gallops, or rubs. 


RESPIRATORY: Clear to auscultation. Breath sounds equal bilaterally. No wheezes

, rales, or rhonchi.  


GASTROINTESTINAL: Abdomen soft, non-tender, nondistended. No hepato-splenomegaly

, or palpable masses. No guarding.


MUSCULOSKELETAL: Extremities without clubbing, cyanosis, or edema. No joint 

tenderness, effusion, or edema noted. No calf tenderness. Negative Homans sign 

bilaterally.


NEUROLOGICAL: Awake and alert. Cranial nerves II through XII intact.  Motor and 

sensory grossly within normal limits. Five out of 5 muscle strength in all 

muscle groups.  Normal speech.


Laboratory





Laboratory Tests








Test


  3/21/18


11:46 3/21/18


11:52


 


White Blood Count 4.0  


 


Red Blood Count 4.71  


 


Hemoglobin 13.3  


 


Hematocrit 39.9  


 


Mean Corpuscular Volume 84.8  


 


Mean Corpuscular Hemoglobin 28.3  


 


Mean Corpuscular Hemoglobin


Concent 33.4 


  


 


 


Red Cell Distribution Width 13.6  


 


Platelet Count 191  


 


Mean Platelet Volume 9.3  


 


Neutrophils (%) (Auto) 54.5  


 


Lymphocytes (%) (Auto) 32.8  


 


Monocytes (%) (Auto) 12.0  


 


Eosinophils (%) (Auto) 0.5  


 


Basophils (%) (Auto) 0.2  


 


Neutrophils # (Auto) 2.2  


 


Lymphocytes # (Auto) 1.3  


 


Monocytes # (Auto) 0.5  


 


Eosinophils # (Auto) 0.0  


 


Basophils # (Auto) 0.0  


 


CBC Comment DIFF FINAL  


 


Differential Comment   


 


Blood Urea Nitrogen 18  


 


Creatinine 1.10  


 


Random Glucose 96  


 


Calcium Level 9.4  


 


Sodium Level 134  


 


Potassium Level 3.9  


 


Chloride Level 103  


 


Carbon Dioxide Level 25.1  


 


Anion Gap 6  


 


Estimat Glomerular Filtration


Rate 68 


  


 


 


Lactic Acid Level  1.0 














 Date/Time


Source Procedure


Growth Status


 


 


 3/21/18 11:52


Blood Peripheral Aerobic Blood Culture


Pending Received


 


 3/21/18 11:52


Blood Peripheral Anaerobic Blood Culture


Pending Received





 3/21/18 11:55


Wound Abdomen Gram Stain


Pending Received


 


 3/21/18 11:55


Wound Abdomen Wound Culture


Pending Received








Result Diagram:  


3/21/18 1146                                                                   

             3/21/18 1146








Assessment and Plan


Problem List:  


(1) Wound infection


ICD Code:  T14.8XXA - Other injury of unspecified body region, initial encounter

; L08.9 - Local infection of the skin and subcutaneous tissue, unspecified


Status:  Acute


Plan:  Although the patient does have HIV/AIDS she is nontoxic at the moment 

and has never been treated with antibiotics as an outpatient.  There is no 

induration.  Patient is instructed on wound care and proper skin care and 

antibiotics have been provided.  Patient will continue these and follow-up with 

her primary infectious disease doctor.  This discussed with the ER MD attending 

and with patient





Assessment and Plan


Patient will need to continue with follow-up with infectious disease for 

management of HIV AIDS


Code Status


Full code











Kristen Barba MD Mar 21, 2018 13:32

## 2018-03-21 NOTE — HHI.DCPOC
Discharge Care Plan


Diagnosis:  


(1) Wound infection


Goals to Promote Your Health


* To prevent worsening of your condition and complications


* To maintain your health at the optimal level


Directions to Meet Your Goals


*** Take your medications as prescribed


*** Follow your dietary instruction


*** Follow activity as directed








*** Keep your appointments as scheduled


*** Take your immunizations and boosters as scheduled


*** If your symptoms worsen call your PCP, if no PCP go to Urgent Care Center 

or Emergency Room***


*** Smoking is Dangerous to Your Health. Avoid second hand smoke***


***Call the 24-hour hour crisis hotline for domestic abuse at [phone]***





WOUND CARE: CLEAN AREA TWICE DAILY WITH WOUND CLEANSER AND COVER WITH DRY 

DRESSING











Kristen Barba MD Mar 21, 2018 13:27

## 2018-03-21 NOTE — PD
Data


Data


Last Documented VS





Vital Signs








  Date Time  Temp Pulse Resp B/P (MAP) Pulse Ox O2 Delivery O2 Flow Rate FiO2


 


3/21/18 11:06 98.8 79 16 132/80 (97) 96   








Orders





 Orders


Basic Metabolic Panel (Bmp) (3/21/18 11:31)


Complete Blood Count With Diff (3/21/18 11:31)


Blood Culture (3/21/18 11:31)


Wound Culture And Gram Stain (3/21/18 11:31)


Iv Access Insert/Monitor (3/21/18 11:31)


Lactic Acid Sepsis Protocol (3/21/18 11:31)


Ed Urine Pregnancytest Poc (3/21/18 12:40)


Piperacil-Tazo 4.5 Gm Premix (Zosyn 4.5 (3/21/18 12:43)


Vancomycin Inj (Vancomycin Inj) (3/21/18 12:43)





Labs





Laboratory Tests








Test


  3/21/18


11:46 3/21/18


11:52


 


White Blood Count 4.0 TH/MM3  


 


Red Blood Count 4.71 MIL/MM3  


 


Hemoglobin 13.3 GM/DL  


 


Hematocrit 39.9 %  


 


Mean Corpuscular Volume 84.8 FL  


 


Mean Corpuscular Hemoglobin 28.3 PG  


 


Mean Corpuscular Hemoglobin


Concent 33.4 % 


  


 


 


Red Cell Distribution Width 13.6 %  


 


Platelet Count 191 TH/MM3  


 


Mean Platelet Volume 9.3 FL  


 


Neutrophils (%) (Auto) 54.5 %  


 


Lymphocytes (%) (Auto) 32.8 %  


 


Monocytes (%) (Auto) 12.0 %  


 


Eosinophils (%) (Auto) 0.5 %  


 


Basophils (%) (Auto) 0.2 %  


 


Neutrophils # (Auto) 2.2 TH/MM3  


 


Lymphocytes # (Auto) 1.3 TH/MM3  


 


Monocytes # (Auto) 0.5 TH/MM3  


 


Eosinophils # (Auto) 0.0 TH/MM3  


 


Basophils # (Auto) 0.0 TH/MM3  


 


CBC Comment DIFF FINAL  


 


Differential Comment   


 


Blood Urea Nitrogen 18 MG/DL  


 


Creatinine 1.10 MG/DL  


 


Random Glucose 96 MG/DL  


 


Calcium Level 9.4 MG/DL  


 


Sodium Level 134 MEQ/L  


 


Potassium Level 3.9 MEQ/L  


 


Chloride Level 103 MEQ/L  


 


Carbon Dioxide Level 25.1 MEQ/L  


 


Anion Gap 6 MEQ/L  


 


Estimat Glomerular Filtration


Rate 68 ML/MIN 


  


 


 


Lactic Acid Level  1.0 mmol/L 











[facility]


Medical Record Reviewed:  Yes


Supervised Visit with SAMIR:  Yes


Narrative Course


I, Dr. Figueredo, have reviewed the advance practice practitioner's 

documentation and am in agreement, met with the patient face to face, made the 

diagnosis, and the medical decision making was done by me. 


*My assessment and Findings: 


CBC & BMP Diagram


3/21/18 11:46








Calcium Level 9.4





 The patient's HIV with a CD4 count of about 85 making her AIDS up diagnosis.  

There is a infectious process about the umbilicus.  Case was endorsed or 

hospitalist to elected to discharge the patient.  On my exam there is no 

induration or tenderness or warmth or significant erythema about the umbilicus 

however there is foul-smelling odor.  He has several water/hygienic techniques 

discussed.  The patient expressed some concern having a preference to stay for 

IV antibiotics.  In any case please refer to the hospitalist documentation.  

All reasonable efforts to meet patient's expectations were afforded.


Diagnosis





 Primary Impression:  


 AIDS (acquired immunodeficiency syndrome), CD4 <=200


 Additional Impression:  


 Wound infection


Referrals:  


Eleni Savage MD


call for appointment


***Med/Other Pt SpecificInfo:  Prescription(s) given


Scripts


Clindamycin (Clindamycin) 150 Mg Cap


450 MG PO Q8HR for Infection for 7 Days, CAP 0 Refills


   Prov: Jose Figueredo MD         3/21/18 


Clindamycin (Clindamycin) 300 Mg Cap


600 MG PO Q8H for Infection, #30 CAP 0 Refills


   Prov: Kristen Barba MD         3/21/18 


Ciprofloxacin (Ciprofloxacin) 500 Mg Tab


500 MG PO BID for Infection, #20 TAB 0 Refills


   Prov: Kristen Barba MD         3/21/18


Disposition:  01 DISCHARGE HOME


Condition:  Stable











Jose Figueredo MD Mar 21, 2018 13:25

## 2018-05-03 ENCOUNTER — HOSPITAL ENCOUNTER (OUTPATIENT)
Dept: HOSPITAL 17 - CLAB | Age: 38
End: 2018-05-03
Attending: NURSE PRACTITIONER
Payer: MEDICARE

## 2018-05-03 DIAGNOSIS — B20: Primary | ICD-10-CM

## 2018-05-03 LAB
ALBUMIN SERPL-MCNC: 3.1 GM/DL (ref 3.4–5)
ALP SERPL-CCNC: 66 U/L (ref 45–117)
ALT SERPL-CCNC: 16 U/L (ref 10–53)
AST SERPL-CCNC: 20 U/L (ref 15–37)
BILIRUB SERPL-MCNC: 0.4 MG/DL (ref 0.2–1)
BUN SERPL-MCNC: 16 MG/DL (ref 7–18)
CALCIUM SERPL-MCNC: 8.6 MG/DL (ref 8.5–10.1)
CHLORIDE SERPL-SCNC: 107 MEQ/L (ref 98–107)
CREAT SERPL-MCNC: 1.25 MG/DL (ref 0.5–1)
ERYTHROCYTE [DISTWIDTH] IN BLOOD BY AUTOMATED COUNT: 14.7 % (ref 11.6–17.2)
GFR SERPLBLD BASED ON 1.73 SQ M-ARVRAT: 59 ML/MIN (ref 89–?)
HCO3 BLD-SCNC: 23.6 MEQ/L (ref 21–32)
HCT VFR BLD CALC: 34.9 % (ref 35–46)
HGB BLD-MCNC: 12.1 GM/DL (ref 11.6–15.3)
MCH RBC QN AUTO: 29.6 PG (ref 27–34)
MCHC RBC AUTO-ENTMCNC: 34.7 % (ref 32–36)
MCV RBC AUTO: 85.4 FL (ref 80–100)
PLATELET # BLD: 240 TH/MM3 (ref 150–450)
PMV BLD AUTO: 8.8 FL (ref 7–11)
PROT SERPL-MCNC: 8.1 GM/DL (ref 6.4–8.2)
RBC # BLD AUTO: 4.09 MIL/MM3 (ref 4–5.3)
SODIUM SERPL-SCNC: 139 MEQ/L (ref 136–145)
WBC # BLD AUTO: 5.4 TH/MM3 (ref 4–11)

## 2018-05-03 PROCEDURE — 80053 COMPREHEN METABOLIC PANEL: CPT

## 2018-05-03 PROCEDURE — 86357 NK CELLS TOTAL COUNT: CPT

## 2018-05-03 PROCEDURE — 86355 B CELLS TOTAL COUNT: CPT

## 2018-05-03 PROCEDURE — 36415 COLL VENOUS BLD VENIPUNCTURE: CPT

## 2018-05-03 PROCEDURE — 86360 T CELL ABSOLUTE COUNT/RATIO: CPT

## 2018-05-03 PROCEDURE — 87536 HIV-1 QUANT&REVRSE TRNSCRPJ: CPT

## 2018-05-03 PROCEDURE — 85027 COMPLETE CBC AUTOMATED: CPT

## 2018-05-03 PROCEDURE — 86359 T CELLS TOTAL COUNT: CPT

## 2018-05-04 LAB
CD3-/CD16+CD56+ PERCENT: 7 % (ref 4–25)
CD3-CD16+CD56+ (ABSOLUTE): 109 (ref 70–760)
LYMPHOCYTES # BLD AUTO: 1627 10*3/UL (ref 850–3900)

## 2018-05-12 ENCOUNTER — HOSPITAL ENCOUNTER (EMERGENCY)
Dept: HOSPITAL 17 - PHED | Age: 38
Discharge: HOME | End: 2018-05-12
Payer: MEDICARE

## 2018-05-12 VITALS
HEART RATE: 88 BPM | DIASTOLIC BLOOD PRESSURE: 87 MMHG | OXYGEN SATURATION: 99 % | SYSTOLIC BLOOD PRESSURE: 146 MMHG | RESPIRATION RATE: 20 BRPM | TEMPERATURE: 97.9 F

## 2018-05-12 VITALS — OXYGEN SATURATION: 99 % | RESPIRATION RATE: 20 BRPM

## 2018-05-12 VITALS
RESPIRATION RATE: 20 BRPM | DIASTOLIC BLOOD PRESSURE: 74 MMHG | HEART RATE: 89 BPM | SYSTOLIC BLOOD PRESSURE: 115 MMHG | OXYGEN SATURATION: 99 %

## 2018-05-12 VITALS — BODY MASS INDEX: 45.99 KG/M2 | HEIGHT: 67 IN | WEIGHT: 293 LBS

## 2018-05-12 VITALS
DIASTOLIC BLOOD PRESSURE: 87 MMHG | OXYGEN SATURATION: 99 % | HEART RATE: 88 BPM | SYSTOLIC BLOOD PRESSURE: 146 MMHG | RESPIRATION RATE: 20 BRPM | TEMPERATURE: 97.9 F

## 2018-05-12 VITALS — SYSTOLIC BLOOD PRESSURE: 132 MMHG | DIASTOLIC BLOOD PRESSURE: 77 MMHG

## 2018-05-12 DIAGNOSIS — F41.9: ICD-10-CM

## 2018-05-12 DIAGNOSIS — K29.70: Primary | ICD-10-CM

## 2018-05-12 DIAGNOSIS — Z21: ICD-10-CM

## 2018-05-12 DIAGNOSIS — F32.9: ICD-10-CM

## 2018-05-12 DIAGNOSIS — Z87.891: ICD-10-CM

## 2018-05-12 LAB
ALBUMIN SERPL-MCNC: 3.1 GM/DL (ref 3.4–5)
ALP SERPL-CCNC: 68 U/L (ref 45–117)
ALT SERPL-CCNC: 17 U/L (ref 10–53)
AST SERPL-CCNC: 18 U/L (ref 15–37)
BASOPHILS # BLD AUTO: 0.1 TH/MM3 (ref 0–0.2)
BASOPHILS NFR BLD: 1.5 % (ref 0–2)
BILIRUB SERPL-MCNC: 0.5 MG/DL (ref 0.2–1)
BUN SERPL-MCNC: 11 MG/DL (ref 7–18)
CALCIUM SERPL-MCNC: 8.7 MG/DL (ref 8.5–10.1)
CHLORIDE SERPL-SCNC: 107 MEQ/L (ref 98–107)
COLOR UR: YELLOW
CREAT SERPL-MCNC: 1.2 MG/DL (ref 0.5–1)
EOSINOPHIL # BLD: 0.1 TH/MM3 (ref 0–0.4)
EOSINOPHIL NFR BLD: 1 % (ref 0–4)
ERYTHROCYTE [DISTWIDTH] IN BLOOD BY AUTOMATED COUNT: 14.3 % (ref 11.6–17.2)
GFR SERPLBLD BASED ON 1.73 SQ M-ARVRAT: 62 ML/MIN (ref 89–?)
GLUCOSE SERPL-MCNC: 115 MG/DL (ref 74–106)
GLUCOSE UR STRIP-MCNC: (no result) MG/DL
HCO3 BLD-SCNC: 27.9 MEQ/L (ref 21–32)
HCT VFR BLD CALC: 37.3 % (ref 35–46)
HGB BLD-MCNC: 12.8 GM/DL (ref 11.6–15.3)
HGB UR QL STRIP: (no result)
KETONES UR STRIP-MCNC: (no result) MG/DL
LEUKOCYTE ESTERASE UR QL STRIP: (no result) /HPF (ref 0–5)
LYMPHOCYTES # BLD AUTO: 1.5 TH/MM3 (ref 1–4.8)
LYMPHOCYTES NFR BLD AUTO: 21.8 % (ref 9–44)
MCH RBC QN AUTO: 29.7 PG (ref 27–34)
MCHC RBC AUTO-ENTMCNC: 34.3 % (ref 32–36)
MCV RBC AUTO: 86.5 FL (ref 80–100)
MONOCYTE #: 0.5 TH/MM3 (ref 0–0.9)
MONOCYTES NFR BLD: 7.5 % (ref 0–8)
NEUTROPHILS # BLD AUTO: 4.8 TH/MM3 (ref 1.8–7.7)
NEUTROPHILS NFR BLD AUTO: 68.2 % (ref 16–70)
NITRITE UR QL STRIP: (no result)
PLATELET # BLD: 216 TH/MM3 (ref 150–450)
PMV BLD AUTO: 9.3 FL (ref 7–11)
PROT SERPL-MCNC: 8.5 GM/DL (ref 6.4–8.2)
RBC # BLD AUTO: 4.31 MIL/MM3 (ref 4–5.3)
SODIUM SERPL-SCNC: 139 MEQ/L (ref 136–145)
SP GR UR STRIP: 1.02 (ref 1–1.03)
SQUAMOUS #/AREA URNS HPF: (no result) /HPF (ref 0–5)
URINE LEUKOCYTE ESTERASE: (no result)
WBC # BLD AUTO: 7 TH/MM3 (ref 4–11)

## 2018-05-12 PROCEDURE — 80053 COMPREHEN METABOLIC PANEL: CPT

## 2018-05-12 PROCEDURE — 96374 THER/PROPH/DIAG INJ IV PUSH: CPT

## 2018-05-12 PROCEDURE — 96361 HYDRATE IV INFUSION ADD-ON: CPT

## 2018-05-12 PROCEDURE — 83690 ASSAY OF LIPASE: CPT

## 2018-05-12 PROCEDURE — 81001 URINALYSIS AUTO W/SCOPE: CPT

## 2018-05-12 PROCEDURE — 84703 CHORIONIC GONADOTROPIN ASSAY: CPT

## 2018-05-12 PROCEDURE — 85025 COMPLETE CBC W/AUTO DIFF WBC: CPT

## 2018-05-12 PROCEDURE — 99284 EMERGENCY DEPT VISIT MOD MDM: CPT

## 2018-05-12 NOTE — PD
HPI


Chief Complaint:  GI Complaint


Time Seen by Provider:  02:27


Travel History


International Travel<30 days:  No


Contact w/Intl Traveler<30days:  No


Traveled to known affect area:  No





History of Present Illness


HPI


Patient has severe burning epigastric pain for the last 2 hours it is localized 

to epigastrium.  It is constant.  It is severe it is burning it is 10 out of 

10.  She is writhing in the stretcher.  She did not take anything for the pain 

she did eat some very spicy jerk chicken [place] food out of the restaurant 

she says there was a stool she thinks it could be the reason she is having 

severe pain.  Patient has no history of gastritis no history of gallbladder 

disease no history of pancreatitis she has not seen another doctor for this it 

started just prior to arrival 2 hours after eating the [place] stew denies  

vomiting diarrhea.  She does have nausea from being in so much pain





PFSH


Past Medical History


Anemia:  Yes


Asthma:  No


Autoimmune Disease:  Yes (HIV + )


Anxiety:  Yes


Depression:  Yes


Heart Rhythm Problems:  Yes (SINUS TACH )


Cancer:  No


Cardiovascular Problems:  Yes


High Cholesterol:  No


Chemotherapy:  No


Chest Pain:  No


COPD:  No


Diabetes:  No


Diminished Hearing:  No


Endocrine:  No


Gastrointestinal Disorders:  Yes


Genitourinary:  No


Hypertension:  No


Immune Disorder:  Yes (HIV/AIDS/MAC)


Implanted Vascular Access Dvce:  No


Musculoskeletal:  Yes


Neurologic:  Yes


Psychiatric:  Yes


Reproductive:  No


Respiratory:  Yes (occ bronchitis)


Immunizations Current:  Yes


Radiation Therapy:  No


Sleep Apnea:  No


Thyroid Disease:  No


PNEUMOCCOCAL Vaccine (Year):  


Pregnant?:  Unknown


LMP:  2018


Menopausal:  No


:  2


Para:  2





Past Surgical History


Abdominal Surgery:  Yes (BIOPSY OF LYMPH NODES- PEG TUBE PLACEMENT)


AICD:  No


Appendectomy:  No


Arteriovenous Shunt:  No


 Section:  Yes (X 2)


Cholecystectomy:  No


Gynecologic Surgery:  Yes ((2) C-Sections)


Joint Replacement:  No


Pacemaker:  No


Other Surgery:  Yes (lymph nodes biopsy in abd)





Social History


Alcohol Use:  No


Tobacco Use:  Yes (quit 2 weeks)


Substance Use:  No





Allergies-Medications


(Allergen,Severity, Reaction):  


Coded Allergies:  


     albuterol (Verified  Allergy, Severe, 18)


     sulfamethoxazole (Verified  Allergy, Severe, Shortness of Breath, 18)


     trimethoprim (Verified  Allergy, Severe, Shortness of Breath, 18)


Reported Meds & Prescriptions





Reported Meds & Active Scripts


Active


Aluminum-Magnesium-Simethicone Liq 200-200-20 Mg/5 Ml Susp 20 Ml PO QID


     Take between meals or as directed. Shake well. Do not exceed 120 mL/24


     hrs.


Lidocaine Viscous 2% Liq 2 % Liq 5 Ml PO Q4-6H


Pepcid (Famotidine) 20 Mg Tab 20 Mg PO BID


Zofran Odt (Ondansetron Odt) 4 Mg Tab 4 Mg SL Q6HR PRN








Review of Systems


Except as stated in HPI:  all other systems reviewed are Neg


Gastrointestinal:  Positive: Nausea, Abdominal Pain





Physical Exam


Narrative


GENERAL: Patient writhing in pain holding her epigastrium crying 10 out of 10 

pain reported


SKIN: Warm and dry.


HEAD: Atraumatic. Normocephalic. 


EYES: Pupils equal and round. No scleral icterus. No injection or drainage. 


ENT: No nasal bleeding or discharge.  Mucous membranes pink and moist.


NECK: Trachea midline. No JVD. 


CARDIOVASCULAR: Regular rate and rhythm.  


RESPIRATORY: No accessory muscle use. Clear to auscultation. Breath sounds 

equal bilaterally. 


GASTROINTESTINAL: Abdomen epigastric severe pain with palpation 


MUSCULOSKELETAL: Extremities without clubbing, cyanosis, or edema. No obvious 

deformities. 


NEUROLOGICAL: Awake and alert. No obvious cranial nerve deficits.  Motor 

grossly within normal limits. Five out of 5 muscle strength in the arms and 

legs.  Normal speech.


PSYCHIATRIC: Appropriate mood and affect; insight and judgment normal.





Data


Data


Last Documented VS





Vital Signs








  Date Time  Temp Pulse Resp B/P (MAP) Pulse Ox O2 Delivery O2 Flow Rate FiO2


 


18 03:18  89 20 115/74 (88) 99 Room Air  


 


18 02:27 97.9       








Orders





 Orders


Complete Blood Count With Diff (18 02:27)


Comprehensive Metabolic Panel (18 02:27)


Urinalysis - C+S If Indicated (18 02:27)


Ed Urine Pregnancytest Poc (18 02:27)


Iv Access Insert/Monitor (18 02:27)


Oxygen Administration (18 02:27)


Oximetry (18 02:27)


Lipase (18 02:27)


Famotidine Inj (Pepcid Inj) (18 03:00)


Sodium Chlor 0.9% 1000 Ml Inj (Ns 1000 M (18 03:15)


Ondansetron  Odt (Zofran  Odt) (18 03:15)


Al-Mag Hy-Si 40-40-4 Mg/Ml Liq (Mag-Al P (18 03:15)


Lidocaine 2% Viscous (Xylocaine 2% Visco (18 03:15)


Ed Discharge Order (18 04:21)





Labs





Laboratory Tests








Test


  18


02:34 18


03:03


 


White Blood Count 7.0 TH/MM3  


 


Red Blood Count 4.31 MIL/MM3  


 


Hemoglobin 12.8 GM/DL  


 


Hematocrit 37.3 %  


 


Mean Corpuscular Volume 86.5 FL  


 


Mean Corpuscular Hemoglobin 29.7 PG  


 


Mean Corpuscular Hemoglobin


Concent 34.3 % 


  


 


 


Red Cell Distribution Width 14.3 %  


 


Platelet Count 216 TH/MM3  


 


Mean Platelet Volume 9.3 FL  


 


Neutrophils (%) (Auto) 68.2 %  


 


Lymphocytes (%) (Auto) 21.8 %  


 


Monocytes (%) (Auto) 7.5 %  


 


Eosinophils (%) (Auto) 1.0 %  


 


Basophils (%) (Auto) 1.5 %  


 


Neutrophils # (Auto) 4.8 TH/MM3  


 


Lymphocytes # (Auto) 1.5 TH/MM3  


 


Monocytes # (Auto) 0.5 TH/MM3  


 


Eosinophils # (Auto) 0.1 TH/MM3  


 


Basophils # (Auto) 0.1 TH/MM3  


 


CBC Comment DIFF FINAL  


 


Differential Comment   


 


Blood Urea Nitrogen 11 MG/DL  


 


Creatinine 1.20 MG/DL  


 


Random Glucose 115 MG/DL  


 


Total Protein 8.5 GM/DL  


 


Albumin 3.1 GM/DL  


 


Calcium Level 8.7 MG/DL  


 


Alkaline Phosphatase 68 U/L  


 


Aspartate Amino Transf


(AST/SGOT) 18 U/L 


  


 


 


Alanine Aminotransferase


(ALT/SGPT) 17 U/L 


  


 


 


Total Bilirubin 0.5 MG/DL  


 


Sodium Level 139 MEQ/L  


 


Potassium Level 3.7 MEQ/L  


 


Chloride Level 107 MEQ/L  


 


Carbon Dioxide Level 27.9 MEQ/L  


 


Anion Gap 4 MEQ/L  


 


Estimat Glomerular Filtration


Rate 62 ML/MIN 


  


 


 


Lipase 140 U/L  


 


Urine Color  YELLOW 


 


Urine Turbidity  CLEAR 


 


Urine pH  6.0 


 


Urine Specific Gravity  1.020 


 


Urine Protein  TRACE mg/dL 


 


Urine Glucose (UA)  NEG mg/dL 


 


Urine Ketones  NEG mg/dL 


 


Urine Occult Blood  NEG 


 


Urine Nitrite  NEG 


 


Urine Bilirubin  NEG 


 


Urine Urobilinogen  0.2 MG/DL 


 


Urine Leukocyte Esterase  NEG 


 


Urine WBC  0-2 /hpf 


 


Urine Squamous Epithelial


Cells 


  0-5 /hpf 


 


 


Microscopic Urinalysis Comment


  


  CULT NOT


INDICATED











MDM


Medical Decision Making


Medical Screen Exam Complete:  Yes


Emergency Medical Condition:  Yes


Medical Record Reviewed:  Yes


Differential Diagnosis


Differential diagnosis is epigastric gastritis pain versus GERD reflux versus 

food poisoning versus pancreatitis versus gallbladder disease


Narrative Course


Pepcid IV as well as Maalox viscous lidocaine has alleviated the patient's pain 

completely and I feel this is gastritis secondary to the food she ate as her 

pain is completely cured only with antacids I do not feel is any need for 

further imaging I do not feel there is any need for any pain medication she got 

a liter of fluid feels completely better Zofran p.o. was given as well 

discharge gastritis with scripts for Zofran as well as viscous lidocaine Maalox





Diagnosis





 Primary Impression:  


 Gastritis


 Qualified Codes:  K29.70 - Gastritis, unspecified, without bleeding


Patient Instructions:  Gastritis (ED), General Instructions


Scripts


Aluminum-Magnesium-Simethicone Liq (Aluminum-Magnesium-Simethicone Liq) 200-200-

20 Mg/5 Ml Susp


20 ML PO QID for Indigestion, #200 ML 0 Refills


   Take between meals or as directed. Shake well. Do not exceed 120 mL/24


   hrs.


   Prov: Iván Shea MD         18 


Lidocaine Viscous 2% Liq (Lidocaine Viscous 2% Liq) 2 % Liq


5 ML PO Q4-6H, #100 ML


   Prov: Iván Shea MD         18 


Famotidine (Pepcid) 20 Mg Tab


20 MG PO BID, #20 TAB 0 Refills


   Prov: Iván Shea MD         18 


Ondansetron Odt (Zofran Odt) 4 Mg Tab


4 MG SL Q6HR Y for Nausea/Vomiting, #12 TAB 0 Refills


   Prov: Iván Shea MD         18


Disposition:  01 DISCHARGE HOME


Condition:  Good











Iván Shea MD May 12, 2018 04:24